# Patient Record
Sex: MALE | Race: WHITE | NOT HISPANIC OR LATINO | Employment: FULL TIME | URBAN - METROPOLITAN AREA
[De-identification: names, ages, dates, MRNs, and addresses within clinical notes are randomized per-mention and may not be internally consistent; named-entity substitution may affect disease eponyms.]

---

## 2023-08-15 ENCOUNTER — TELEPHONE (OUTPATIENT)
Dept: FAMILY MEDICINE | Facility: CLINIC | Age: 28
End: 2023-08-15

## 2023-08-22 ENCOUNTER — TELEPHONE (OUTPATIENT)
Dept: FAMILY MEDICINE | Facility: CLINIC | Age: 28
End: 2023-08-22
Payer: COMMERCIAL

## 2023-08-22 NOTE — CONFIDENTIAL NOTE
Telephone Intake--TG Adult  Date: .td  Client Name:  .name  Preferred Name:       MRN: sreedhar  : .bday        Age:  .age        Presenting Problem / Reason for Assessment   (Clinical History &Symptoms):       Clarify their goals/expected services from TG medical care--what are they looking for?  Clarify with patient (as necessary) that we are not a primary care clinic and that we do not have a surgeon. We strongly recommend that patients have a primary care doctor for their overall health needs, and we can refer to primary care clinics. We can assist with surgery referrals.        Length of time experiencing symptoms:         Seen Other Providers for Gender (if so, where):    M.D/other.(hormones) :    --If yes, request records from the provider at the 1st session.    Therapist:    --if yes, request a referral or summary letter from the therapist at the 1st session..    Psychiatrist:    --if yes,, request records from the provider at the 1st session..      Other Medical/Mental Health Diagnoses:        If needed, clarify if patient calling from group home or other supervised living arrangement  Note if patient has no mental health or cognitive diagnosis, but phone behavior suggests impairment      Medications:          Primary Care Provider: Yes (name/clinic)                     No  --If multiple medical conditions, request recent records from primary doctor at the 1st session..      Referral Source:        Follow Up:        Please Verify Registration    If patient has [employer] or [employer], send to .     Prep Welcome Packet and have patient come half hour beforehand to fill out forms in packet (health history form, transgender history, Safety Screening, PHQ9, and LINDSEY-7.

## 2023-08-23 NOTE — TELEPHONE ENCOUNTER
Telephone Intake--TG Adult  Date: 2023  Client Name:  Devante  Preferred Name: Steffany      MRN:  [id number]  : 1995      Age:  28      Presenting Problem / Reason for Assessment   (Clinical History &Symptoms):     Trans Female  Goal: Body image, breast development, improving skin, hair, nails  Family Support: Positive  Dresses and presents female    Clarify their goals/expected services from TG medical care--what are they looking for?  Clarify with patient (as necessary) that we are not a primary care clinic and that we do not have a surgeon. We strongly recommend that patients have a primary care doctor for their overall health needs, and we can refer to primary care clinics. We can assist with surgery referrals.  Facial Feminization, Breast Augmentation      Length of time experiencing symptoms:  15+ yrs, 4 yrs actively out       Seen Other Providers for Gender (if so, where):    M.D/other.(hormones) :  NO  --If yes, request records from the provider at the 1st session.    Therapist:  Mattie Looney  General therapy provided through place of employment  --if yes, request a referral or summary letter from the therapist at the 1st session..    Psychiatrist:  NO  --if yes,, request records from the provider at the 1st session..      Other Medical/Mental Health Diagnoses:  NO      If needed, clarify if patient calling from group home or other supervised living arrangement  Note if patient has no mental health or cognitive diagnosis, but phone behavior suggests impairment      Medications:  NO        Primary Care Provider: NO   --If multiple medical conditions, request recent records from primary doctor at the 1st session..      Referral Source:        Follow Up:        Please Verify Registration    If patient has [employer] or [employer], send to .     Prep Welcome Packet and have patient come half hour beforehand to fill out forms in packet (health history  form, transgender history, Safety Screening, PHQ9, and LINDSEY-7.

## 2023-09-19 ENCOUNTER — OFFICE VISIT (OUTPATIENT)
Dept: FAMILY MEDICINE | Facility: CLINIC | Age: 28
End: 2023-09-19
Payer: COMMERCIAL

## 2023-09-19 DIAGNOSIS — F64.0 GENDER DYSPHORIA IN ADULT: Primary | ICD-10-CM

## 2023-09-19 DIAGNOSIS — F41.9 ANXIETY: ICD-10-CM

## 2023-09-19 PROCEDURE — 99204 OFFICE O/P NEW MOD 45 MIN: CPT | Performed by: FAMILY MEDICINE

## 2023-09-19 NOTE — PROGRESS NOTES
Transgender Diagnostic and Assessment    S.O.G.I.  Patient's Preferred First Name:  Steffany  Patient's pronouns:  she/her/hers       Patient's gender identity:  Transgender Female  Patient's sex assigned at birth:  Male        PAST GENDER THERAPY: none  PAST HORMONE USE: none  GENDER RELATED SURGERIES: none    INTRODUCTION AND GOALS  28 year old trans woman presents for GAHT with estrogen  to develop physical characteristics consistent with identified gender      GENDER DEVELOPMENT    Early Childhood:  No sense of gender difference at this time, perhaps general sense of body discomfort    Around age 10-14 (puberty, early adolescence):  Began wondering why uncomfortable with her body, questioning if she was gold. Desire to shave legs, but not acceptable in family. Would choose to play female characters in video games. With puberty, increasing disconnect with body and what it looked like.      Ages [age] (High School, later adolescence):  Started shaving legs, gave sense of euphoria. More concern about facial hair. Grew hair long and recognized her non-straight sexual orientation. Was exposed/explored gender concepts and gender affirming interventions on Internet, and felt gender diversity might be explanation for her ongoing discomfort.       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 She then met girlfriend and moved to [place] at age 24. Girlfriend in school for sexual and gender studies, helped Steffany explore and solidify her gender identity, didn't have to hide it from her family. Started using pronouns, wearing gender appropriate clothing, make up, and working under her name and pronouns.     Considering GAHT since age 23, but at first felt she was too old. With social transition, felt more secure and able to move forward with GAHT.    BODY,  ANATOMIC CONCERNS    Puberty experiences and response:overall discomfort about body, lack of connection with body, no depression  No SIB      Top 3  physical gender related physical changes:   Body and facial hair reduction; face and body shape, breasts      DISCLOSURE and RELATIONSHIP, SOCIAL IMPACTS    Currently lives with: girlfriend and girlfriend's parents  Out regarding gender and plans for GAHT to all of of aboe, all supportive  Mother--out regarding gender, GAHT, supportive  Father--not involved in her life  No siblings  Work--out regarding gender, GAHT at work, supportive  Has appointment with PCP pending  Friends supportive  Has general therapy through work      CURRENT SOCIAL, LEGAL OR PHYSICAL TRANSITIONS    Legal Transitions: (Name, Gender Markers, other) none  Social Transitions:   --Present as affirmed gender in private: yes  --Present as affirmed gender with immediate family/household:yes  --present as affirmed gender work/school: yes  --Use name/pronouns: yes  --Other:    Physical transitions: (hair removal, binder, other) no      CURRENT SOCIAL, ECONOMIC AND HEALTH SYSTEMS SUPPORTS    Emotional support--who: friends, girlfriend, mother  Logistical support--who: girlfriend  Health insurance:Yes:   Afford  life essentials and associated costs gender care: Yes:   Reliable transportation: Yes  Primary Care provider: Yes:   Housing yes        TRANSGENDER SUPPORTS/ TRANSPHOBIA    Knows other transgender and gender diverse people:Yes, in person, a few online  Familiar with transgender resources in their area:only [employer]        PSYCHIATRIC HISTORY  Mental health diagnoses/conditions: Anxiety, intermittent depression, no formal diagnoses  Current and/or past medications:none  Medication provider:  Psychiatric hospitalizations: none  Suicide attempts: none  Eating disorders?  Very overweight in high school, lost a lot, now binge eating and tendency to calorie restriction  No purging. Previously mother concerned, but not currently problematic  SIB: none  Alcohol--almost never, not problem in past  Substances-marijuana, 1x/day, for sleep       Mental Status  "Assessment:  Appearance:  No apparent distress and Casually dressed  Behavior/relationship to examiner/demeanor:  Cooperative, Engaged, and Pleasant  Orientation: Oriented to person, place, time, and situation  Speech Rate:  Normal  Speech Spontaneity:  Normal  Mood:  \"good\" and comfortable  Affect:  Appropriate/mood-congruent  Thought Process (Associations):  Logical, Linear, and Goal directed  Thought Content:  no evidence of suicidal or homicidal ideation  Abnormal Perception:  None  Attention/Concentration:  Normal  Language:  Intact  Insight:  Good  Judgment:  Good    Motor activity/EPS:  Normal  Fund of Knowledge/Intelligence:  Above average    PHQ9=6, GAD7=8      A/P  Gender dysphoria  Anxiety      Patient counseled on the following issues:    Patient meets the guideline criteria for gender dysphoria as outlined in DSM-5 and WPATH SOC 8, and may benefit from from hormone therapy.     They do appear to have strong and stable understanding of their gender identity, and are able to communicate that understanding with sufficient clarity to guide hormone therapy.     As per WPATH SOC 8 guidelines, mental health issues that could negatively impact the outcome of gender-affirming medical treatments are assessed, with risks and benefits discussed as below    --Recommend continue with current mental supports  --Given history of body image and gender affirming surgical goal, recommend gender therapist at some point.     They have engaged in or have a plan to manage appropriate social transitions and any associated disclosures of their gender status, reducing risk of psychosocial harms.     They have identified an emotional and logistical support system to assist them with challenges commonly associated with medical and social transition.    They have knowledge of transgender peer/community resources and have transgender peer support.   --Resource sheet given    There are no significant financial, insurance, " transportation or housing barriers to safe, effective hormone therapy at this time.    Patient to schedule medical evauation and informed consent appointment.     55 minutes spent by me on the date of the encounter doing chart review, patient visit, and documentation      Rachid Lovelace MD, PhD  [facility]

## 2023-09-21 ENCOUNTER — DOCUMENTATION ONLY (OUTPATIENT)
Dept: FAMILY MEDICINE | Facility: CLINIC | Age: 28
End: 2023-09-21
Payer: COMMERCIAL

## 2023-09-21 NOTE — PROGRESS NOTES
6/12/2023    Provider:  Maria Luisa Jack MD    BP: 135 / 71  P:    69  W:   184lb  R:    16  O2:   99%  T:      97.3 F      Fannie Sullivan CMA

## 2023-09-29 PROBLEM — F64.0 GENDER DYSPHORIA IN ADULT: Status: ACTIVE | Noted: 2023-09-29

## 2023-10-02 VITALS
HEART RATE: 72 BPM | DIASTOLIC BLOOD PRESSURE: 79 MMHG | BODY MASS INDEX: 24.65 KG/M2 | SYSTOLIC BLOOD PRESSURE: 132 MMHG | WEIGHT: 186 LBS | HEIGHT: 73 IN

## 2023-10-02 ASSESSMENT — ANXIETY QUESTIONNAIRES
3. WORRYING TOO MUCH ABOUT DIFFERENT THINGS: SEVERAL DAYS
2. NOT BEING ABLE TO STOP OR CONTROL WORRYING: SEVERAL DAYS
GAD7 TOTAL SCORE: 8
GAD7 TOTAL SCORE: 8
6. BECOMING EASILY ANNOYED OR IRRITABLE: MORE THAN HALF THE DAYS
7. FEELING AFRAID AS IF SOMETHING AWFUL MIGHT HAPPEN: SEVERAL DAYS
1. FEELING NERVOUS, ANXIOUS, OR ON EDGE: MORE THAN HALF THE DAYS
5. BEING SO RESTLESS THAT IT IS HARD TO SIT STILL: NOT AT ALL

## 2023-10-02 ASSESSMENT — PATIENT HEALTH QUESTIONNAIRE - PHQ9
SUM OF ALL RESPONSES TO PHQ QUESTIONS 1-9: 6
5. POOR APPETITE OR OVEREATING: SEVERAL DAYS

## 2023-10-13 ENCOUNTER — OFFICE VISIT (OUTPATIENT)
Dept: FAMILY MEDICINE | Facility: CLINIC | Age: 28
End: 2023-10-13
Payer: COMMERCIAL

## 2023-10-13 VITALS
SYSTOLIC BLOOD PRESSURE: 129 MMHG | WEIGHT: 189.2 LBS | HEART RATE: 78 BPM | DIASTOLIC BLOOD PRESSURE: 78 MMHG | BODY MASS INDEX: 25.13 KG/M2

## 2023-10-13 DIAGNOSIS — G43.909 MIGRAINE WITHOUT STATUS MIGRAINOSUS, NOT INTRACTABLE, UNSPECIFIED MIGRAINE TYPE: ICD-10-CM

## 2023-10-13 DIAGNOSIS — F64.0 GENDER DYSPHORIA IN ADULT: Primary | ICD-10-CM

## 2023-10-13 PROCEDURE — 99215 OFFICE O/P EST HI 40 MIN: CPT | Performed by: FAMILY MEDICINE

## 2023-10-13 RX ORDER — FINASTERIDE 5 MG/1
5 TABLET, FILM COATED ORAL DAILY
COMMUNITY
Start: 2023-09-22

## 2023-10-13 NOTE — PROGRESS NOTES
Medical Evaluation for [facility]          HPI   ID: 28 year old trans women      CC: Here for medical evaluation for [facility] with estrogen    HPI:    She has a long standing history of gender dysphoria; the gender history and psychosocial assessment was peformed on 9/19/2023. See this document for gender history.     Current medical conditions:  Patient Active Problem List   Diagnosis    Gender dysphoria in adult    Migraines 1/3 month--no aura       No Known Allergies     Current medications:  Current Outpatient Medications   Medication    finasteride (PROSCAR) 5 MG tablet     No current facility-administered medications for this visit.        Past Medical History:  No surgeries  No hospitalizations    Family History:  Mother--OCD, anxiety,depression, HTN, migraines  Father--HTN, unknown mental health, alchol/cd  No siblings  Mat grandmother--migraines  Pat. Grandmother--HTN, metnal heatlh, alchol/cd  Pat. Grandfather--htn, mental health, alcohol/cd  Aunt--CVD related to migraine  No VTE      Social History:  Low carb diet, + dairy  Never smoker  Activity: run and weights, 4x/wk, 30 min run    Work at [employer]  No children    Sexual History:  Attracted to: all but trans men, id bisexual  Currently sexually active: Yes: 1 partners  Number of partners: 3 partners in last year  History STI's:none  Tested for HIV: neg 2023  HPV vaccine status: not vaccinated  Cervical cancer screening status: n/a      ROS  12 point ROS negative except where noted above      Physical Exam  EXAM:  Blood pressure 129/78, pulse 78, weight 85.8 kg (189 lb 3.2 oz).  Body mass index is 25.13 kg/m .    Vitals reviewed  Constitutional: healthy, alert, and no distress   Cardiovascular: negative, PMI normal. No lifts, heaves, or thrills. RRR. No murmurs, clicks gallops or rub  Respiratory: negative, Percussion normal. Good diaphragmatic excursion. Lungs clear  Psychiatric: mentation appears normal and affect normal/bright  Neck: Neck supple. No  adenopathy. Thyroid symmetric, normal size,, Carotids without bruits.  Abdomen: Abdomen soft, non-tender. BS normal. No masses, organomegaly  SKIN: no suspicious lesions or rashes  JOINT/EXTREMITIES: extremities normal- no gross deformities noted, gait normal, and normal muscle tone       9/21/2023 PCP annual exam reviewed, lipids and HgbA1c from that visit normal  A/P  Gender dysphoria in adult  2. Migraines  The feminizing effects of hormone therapy were discussed at length, along the variability of outcomes and general timeframe for expected feminizing changes. Permanent vs semi-reversible changes were reviewed.   Patient was counseled regarding the potential risks and side effects of feminizing therapy including:  - reduced fertility, reproductive options, need for ongoing contraception (if indicated),  -changes to sexual function including reduced erections, libido and ejaculation  -potential for weight gain, changes to trigylcerides, and other indirect metabolic effects  -increased risk of venous thromboembolic events, gallstones, liver function tests  --mood changes, long term cardiovascular risks, potential cancer risks including breast cancer    I discussed the possible risk of temporary or irreversible impairment of the fertility with the patient today. She demonstrated a complete understanding of the fertility preservation options and declined fertility preservation.     Counseled regarding potential effects of GAHT on migraine severity and frequency    Medications used in hormone therapy and methods of administration were reviewed--discussed SL vs oral administration    Questions were elicited and answered, and patient verbally consent to begin hormone therapy.      Next steps:  Labs: CMP, testosterone      Follow-up 1 month for start

## 2023-10-20 LAB
ALBUMIN (EXTERNAL): 4.1 G/DL (ref 3.5–5)
ALKALINE PHOSPHATASE (EXTERNAL): 50 U/L (ref 40–150)
ALT SERPL-CCNC: 16 U/L
ANION GAP SERPL CALC-SCNC: 10 MMOL/L (ref 7–16)
AST SERPL-CCNC: 19 U/L (ref 10–40)
BILIRUB SERPL-MCNC: 0.9 MG/DL (ref 0.2–1.2)
BUN SERPL-MCNC: 20 MG/DL (ref 7–26)
CALCIUM (EXTERNAL): 8.9 MG/DL (ref 8.4–10.4)
CHLORIDE (EXTERNAL): 105 MMOL/L (ref 98–109)
CO2 (EXTERNAL): 27 MMOL/L (ref 20–29)
CREATININE (EXTERNAL): 0.71 MG/DL (ref 0.73–1.18)
GFR ESTIMATED (EXTERNAL): >60 ML/MIN/1.73M2
GFR ESTIMATED (IF AFRICAN AMERICAN) (EXTERNAL): ABNORMAL ML/MIN/1.73M2
GLUCOSE (EXTERNAL): 100 MG/DL (ref 70–100)
POTASSIUM (EXTERNAL): 4 MMOL/L (ref 3.5–5.1)
PROTEIN TOTAL (EXTERNAL): 6.8 G/DL (ref 6.4–8.3)
SEX HORMONE BIND GLOBULIN: 55 NMOL/L (ref 13–74)
SODIUM (EXTERNAL): 142 MMOL/L (ref 136–145)
TESTOST SERPL-MCNC: 483 NG/DL (ref 200–745)
TESTOSTERONE FREE: 7.2 NG/DL (ref 3.1–12.8)
TESTOSTERONE, BIOAVAILABLE, S: 168.7 NG/DL (ref 71.7–300)

## 2023-10-22 ENCOUNTER — HEALTH MAINTENANCE LETTER (OUTPATIENT)
Age: 28
End: 2023-10-22

## 2023-10-27 NOTE — RESULT ENCOUNTER NOTE
Steffany,    Your baseline labs were all normal. We can discuss injectable vs. Other forms of estradiol at your next visit, but there is no evidence that injectable estradiol gives better outcomes (breast size, less body/facial hair, more curves,etc)    Rachid Lovelace MD

## 2023-11-20 ENCOUNTER — VIRTUAL VISIT (OUTPATIENT)
Dept: FAMILY MEDICINE | Facility: CLINIC | Age: 28
End: 2023-11-20
Payer: COMMERCIAL

## 2023-11-20 DIAGNOSIS — F64.0 GENDER DYSPHORIA IN ADULT: Primary | ICD-10-CM

## 2023-11-20 PROCEDURE — 99214 OFFICE O/P EST MOD 30 MIN: CPT | Mod: VID | Performed by: FAMILY MEDICINE

## 2023-11-20 RX ORDER — ESTRADIOL 2 MG/1
2 TABLET ORAL DAILY
Qty: 90 TABLET | Refills: 0 | Status: SHIPPED | OUTPATIENT
Start: 2023-11-20

## 2023-11-20 ASSESSMENT — PAIN SCALES - GENERAL: PAINLEVEL: NO PAIN (0)

## 2023-11-20 NOTE — NURSING NOTE
Is the patient currently in the state of [place]? YES    Visit mode:VIDEO    If the visit is dropped, the patient can be reconnected by: VIDEO VISIT: Text to cell phone:   Telephone Information:   Mobile [phone]       Will anyone else be joining the visit? NO  (If patient encounters technical issues they should call [phone])    How would you like to obtain your AVS? MyChart    Are changes needed to the allergy or medication list? Pt stated no changes to allergies and Pt stated no med changes    Reason for visit: MICHELLE CHOU

## 2023-11-20 NOTE — PROGRESS NOTES
The patient has been notified of following:       Patient has given verbal consent for Video visit? Yes    Patient would like the video invitation sent by: Send to e-mail at:     Video Start Time: 7:58 AM             ORQUIDEA Jeter is a 28 year old individual that uses pronouns She/Her/Hers/Herself that presents today for follow up of:  feminizing hormone therapy.   Alone or accompanied by: accompanied today by  Gender identity: female  Started Hormone  therapy  n/a  Continues on Other n/a*.   Any special concerns today?    Wonder pros/cons  injectable vs. Oral     On hormones?  No  Gender related body changes since last visit: n/a    Breakthrough bleeding? Does Not Apply    New health concerns since last visit:  ---none    No past surgical history on file.    Patient Active Problem List   Diagnosis    Gender dysphoria in adult       Current Outpatient Medications   Medication Sig Dispense Refill    finasteride (PROSCAR) 5 MG tablet Take 5 mg by mouth daily         History   Smoking Status    Never   Smokeless Tobacco    Never        No Known Allergies    There are no preventive care reminders to display for this patient.      Problem, Medication and Allergy Lists were reviewed and are current..         Review of Systems:   Review of Systems           Labs:   Results from last visit:  Office Visit on 10/13/2023   Component Date Value Ref Range Status    Testosterone Total 10/20/2023 483  200 - 745 ng/dL Final    Testosterone Free 10/20/2023 7.2  3.1 - 12.8 ng/dL Final    SEX HORMONE BIND GLOBULIN 10/20/2023 55  13 - 74 nmol/L Final    Testosterone, Bioavailable, S 10/20/2023 168.7  71.7 - 300.0 ng/dL Final    Sodium (External) 10/20/2023 142  136 - 145 mmol/L Final    Potassium (External) 10/20/2023 4.0  3.5 - 5.1 mmol/L Final    Chloride (External) 10/20/2023 105  98 - 109 mmol/L Final    CO2 (External) 10/20/2023 27  20 - 29 mmol/L Final    Anion Gap (External) 10/20/2023 10  7 - 16 mmol/L Final    Glucose  (External) 10/20/2023 100  70 - 100 mg/dL Final    Urea Nitrogen (External) 10/20/2023 20  7 - 26 mg/dL Final    Creatinine (External) 10/20/2023 0.71 (A)  0.73 - 1.18 mg/dL Final    Calcium (External) 10/20/2023 8.9  8.4 - 10.4 mg/dL Final    Protein Total (External) 10/20/2023 6.8  6.4 - 8.3 g/dL Final    Albumin (External) 10/20/2023 4.1  3.5 - 5.0 g/dL Final    Bilirubin Total (External) 10/20/2023 0.9  0.2 - 1.2 mg/dL Final    Alkaline Phosphatase (External) 10/20/2023 50  40 - 150 U/L Final    AST (External) 10/20/2023 19  10 - 40 U/L Final    ALT (External) 10/20/2023 16  <=55 - <=55 U/L Final    GFR Estimated (External) 10/20/2023 >60  mL/min/1.73m2 Final    GFR Estimated (if * 10/20/2023 /  mL/min/1.73m2 Final         EXAM:  Constitutional: healthy, alert, and no distress   Psychiatric: mentation appears normal and affect normal/bright     Assessment and Plan   Gender dysphoria  Reviewed labs with patient  No contraindications to hormone start  Counseled patient on the differences between oral and injectable estradiol, no evidence of difference in effectiveness if serum levels in appropriate ranges, dicussed other advantages/disadvantages of each.  Start estradiol oral 2 mg daily, instructed in possible early effects  Follow-up 1-2 months    Contraception:     Video-Visit Details    Type of service:  Video Visit    Video End Time (time video stopped): 808    Originating Location (pt. Location): Home    Distant Location (provider location):  [facility]     Mode of Communication:  Video Conference via video platform: Sergio Lovelace MD      [facility]  Questions were elicited and answered.     Rachid Lovelace MD

## 2023-11-20 NOTE — PROGRESS NOTES
Virtual Visit Details    Type of service:  Video Visit     Originating Location (pt. Location): Home    Distant Location (provider location):  On-site  Platform used for Video Visit: Bunny

## 2024-12-15 ENCOUNTER — HEALTH MAINTENANCE LETTER (OUTPATIENT)
Age: 29
End: 2024-12-15